# Patient Record
Sex: FEMALE | Race: WHITE | NOT HISPANIC OR LATINO | URBAN - METROPOLITAN AREA
[De-identification: names, ages, dates, MRNs, and addresses within clinical notes are randomized per-mention and may not be internally consistent; named-entity substitution may affect disease eponyms.]

---

## 2017-10-23 ENCOUNTER — OUTPATIENT (OUTPATIENT)
Dept: OUTPATIENT SERVICES | Facility: HOSPITAL | Age: 62
LOS: 1 days | Discharge: HOME | End: 2017-10-23

## 2017-10-23 DIAGNOSIS — Z12.31 ENCOUNTER FOR SCREENING MAMMOGRAM FOR MALIGNANT NEOPLASM OF BREAST: ICD-10-CM

## 2018-10-26 ENCOUNTER — OUTPATIENT (OUTPATIENT)
Dept: OUTPATIENT SERVICES | Facility: HOSPITAL | Age: 63
LOS: 1 days | Discharge: HOME | End: 2018-10-26

## 2018-10-26 DIAGNOSIS — Z12.31 ENCOUNTER FOR SCREENING MAMMOGRAM FOR MALIGNANT NEOPLASM OF BREAST: ICD-10-CM

## 2019-10-28 ENCOUNTER — OUTPATIENT (OUTPATIENT)
Dept: OUTPATIENT SERVICES | Facility: HOSPITAL | Age: 64
LOS: 1 days | Discharge: HOME | End: 2019-10-28
Payer: MEDICARE

## 2019-10-28 ENCOUNTER — OTHER (OUTPATIENT)
Age: 64
End: 2019-10-28

## 2019-10-28 DIAGNOSIS — Z12.31 ENCOUNTER FOR SCREENING MAMMOGRAM FOR MALIGNANT NEOPLASM OF BREAST: ICD-10-CM

## 2019-10-28 PROBLEM — Z00.00 ENCOUNTER FOR PREVENTIVE HEALTH EXAMINATION: Status: ACTIVE | Noted: 2019-10-28

## 2019-10-28 PROCEDURE — 77063 BREAST TOMOSYNTHESIS BI: CPT | Mod: 26

## 2019-10-28 PROCEDURE — 77067 SCR MAMMO BI INCL CAD: CPT | Mod: 26

## 2019-10-29 ENCOUNTER — OTHER (OUTPATIENT)
Age: 64
End: 2019-10-29

## 2019-10-29 ENCOUNTER — OUTPATIENT (OUTPATIENT)
Dept: OUTPATIENT SERVICES | Facility: HOSPITAL | Age: 64
LOS: 1 days | Discharge: HOME | End: 2019-10-29
Payer: MEDICARE

## 2019-10-29 DIAGNOSIS — R92.8 OTHER ABNORMAL AND INCONCLUSIVE FINDINGS ON DIAGNOSTIC IMAGING OF BREAST: ICD-10-CM

## 2019-10-29 PROCEDURE — G0279: CPT | Mod: 26,RT

## 2019-10-29 PROCEDURE — 76642 ULTRASOUND BREAST LIMITED: CPT | Mod: 26,RT

## 2019-10-29 PROCEDURE — 77065 DX MAMMO INCL CAD UNI: CPT | Mod: 26,RT

## 2019-10-30 ENCOUNTER — TRANSCRIPTION ENCOUNTER (OUTPATIENT)
Age: 64
End: 2019-10-30

## 2020-03-16 ENCOUNTER — APPOINTMENT (OUTPATIENT)
Dept: OBGYN | Facility: CLINIC | Age: 65
End: 2020-03-16
Payer: MEDICARE

## 2020-03-16 VITALS
SYSTOLIC BLOOD PRESSURE: 128 MMHG | HEIGHT: 62 IN | DIASTOLIC BLOOD PRESSURE: 80 MMHG | BODY MASS INDEX: 25.21 KG/M2 | WEIGHT: 137 LBS | HEART RATE: 79 BPM

## 2020-03-16 DIAGNOSIS — Z80.3 FAMILY HISTORY OF MALIGNANT NEOPLASM OF BREAST: ICD-10-CM

## 2020-03-16 DIAGNOSIS — Z82.49 FAMILY HISTORY OF ISCHEMIC HEART DISEASE AND OTHER DISEASES OF THE CIRCULATORY SYSTEM: ICD-10-CM

## 2020-03-16 PROCEDURE — G0101: CPT

## 2020-03-16 NOTE — PHYSICAL EXAM
[Awake] : awake [Alert] : alert [Acute Distress] : no acute distress [Mass] : no breast mass [Nipple Discharge] : no nipple discharge [Axillary LAD] : no axillary lymphadenopathy [Soft] : soft [Tender] : non tender [Oriented x3] : oriented to person, place, and time [Vulvar Atrophy] : vulvar atrophy [Normal] : uterus [Atrophy] : atrophy [No Bleeding] : there was no active vaginal bleeding [Uterine Adnexae] : were not tender and not enlarged [No Tenderness] : no rectal tenderness [External Hemorrhoid] : an external hemorrhoid

## 2020-03-16 NOTE — CHIEF COMPLAINT
[Annual Visit] : annual visit [FreeTextEntry1] : Patient is here for annual exam , up to date with PE , blood work ,  hx abnormal Pap smear in the past , patient denies pelvic pain , vaginal bleeding , mammography due  in October 2020

## 2020-03-16 NOTE — DISCUSSION/SUMMARY
[FreeTextEntry1] : Patient here for routine exam. Has history of cone biopsy and abnormal pap.\par Has no complaints\par High risk, needs to be seen yearly.\par \par Lidia Ji M.D.\par

## 2020-03-18 LAB — HPV HIGH+LOW RISK DNA PNL CVX: NOT DETECTED

## 2020-03-19 ENCOUNTER — APPOINTMENT (OUTPATIENT)
Dept: GASTROENTEROLOGY | Facility: CLINIC | Age: 65
End: 2020-03-19

## 2020-06-02 ENCOUNTER — TRANSCRIPTION ENCOUNTER (OUTPATIENT)
Age: 65
End: 2020-06-02

## 2020-09-01 ENCOUNTER — APPOINTMENT (OUTPATIENT)
Dept: GASTROENTEROLOGY | Facility: CLINIC | Age: 65
End: 2020-09-01
Payer: MEDICARE

## 2020-09-01 DIAGNOSIS — Z78.9 OTHER SPECIFIED HEALTH STATUS: ICD-10-CM

## 2020-09-01 PROCEDURE — 99204 OFFICE O/P NEW MOD 45 MIN: CPT | Mod: 95

## 2020-09-01 NOTE — ASSESSMENT
[FreeTextEntry1] : 65 year old female patient average risk for CRC, presents for screening colonoscopy.\par She reports postprandial epigastric pain, new onset and concerning to her. \par No nausea, vomiting, weight loss, dysphagia, blood in stools or abdominal pain, however she reports new changes in bowel habits towards more constipation. \par \par Rec: EGD, screening colonoscopy\par Risks and benefits discussed with patient.\par

## 2020-09-01 NOTE — HISTORY OF PRESENT ILLNESS
[Home] : at home, [unfilled] , at the time of the visit. [Medical Office: ([facility])___] : at the medical office located in  [Verbal consent obtained from patient] : the patient, [unfilled] [FreeTextEntry4] : Loreto Jaime [de-identified] : 65 year old female patient average risk for CRC, presents for screening colonoscopy.\par She reports postprandial epigastric pain, new onset and concerning to her. \par No nausea, vomiting, weight loss, dysphagia, blood in stools or abdominal pain, however she reports new changes in bowel habits towards more constipation.

## 2020-10-27 ENCOUNTER — LABORATORY RESULT (OUTPATIENT)
Age: 65
End: 2020-10-27

## 2020-10-27 ENCOUNTER — OUTPATIENT (OUTPATIENT)
Dept: OUTPATIENT SERVICES | Facility: HOSPITAL | Age: 65
LOS: 1 days | Discharge: HOME | End: 2020-10-27

## 2020-10-27 DIAGNOSIS — Z11.59 ENCOUNTER FOR SCREENING FOR OTHER VIRAL DISEASES: ICD-10-CM

## 2020-10-29 ENCOUNTER — OUTPATIENT (OUTPATIENT)
Dept: OUTPATIENT SERVICES | Facility: HOSPITAL | Age: 65
LOS: 1 days | Discharge: HOME | End: 2020-10-29
Payer: MEDICARE

## 2020-10-29 DIAGNOSIS — Z12.31 ENCOUNTER FOR SCREENING MAMMOGRAM FOR MALIGNANT NEOPLASM OF BREAST: ICD-10-CM

## 2020-10-29 PROCEDURE — 77063 BREAST TOMOSYNTHESIS BI: CPT | Mod: 26

## 2020-10-29 PROCEDURE — 77067 SCR MAMMO BI INCL CAD: CPT | Mod: 26

## 2020-10-29 NOTE — ASU PATIENT PROFILE, ADULT - PSH
History of surgery  LAST COLONOSCOPY EIGHT YEARS AGO  History of surgery  CHOLECYSTECTOMY  History of surgery  C- SECTION X1  History of surgery  RIGHT ANKLE SURGERYWITH PLATES AND SCREWS IMPLANTED

## 2020-10-30 ENCOUNTER — TRANSCRIPTION ENCOUNTER (OUTPATIENT)
Age: 65
End: 2020-10-30

## 2020-10-30 ENCOUNTER — OUTPATIENT (OUTPATIENT)
Dept: OUTPATIENT SERVICES | Facility: HOSPITAL | Age: 65
LOS: 1 days | Discharge: HOME | End: 2020-10-30
Payer: MEDICARE

## 2020-10-30 ENCOUNTER — RESULT REVIEW (OUTPATIENT)
Age: 65
End: 2020-10-30

## 2020-10-30 VITALS
RESPIRATION RATE: 17 BRPM | TEMPERATURE: 98 F | SYSTOLIC BLOOD PRESSURE: 129 MMHG | DIASTOLIC BLOOD PRESSURE: 75 MMHG | HEIGHT: 62 IN | OXYGEN SATURATION: 98 % | WEIGHT: 134.04 LBS | HEART RATE: 75 BPM

## 2020-10-30 VITALS — DIASTOLIC BLOOD PRESSURE: 71 MMHG | SYSTOLIC BLOOD PRESSURE: 138 MMHG | HEART RATE: 77 BPM | RESPIRATION RATE: 15 BRPM

## 2020-10-30 DIAGNOSIS — Z98.890 OTHER SPECIFIED POSTPROCEDURAL STATES: Chronic | ICD-10-CM

## 2020-10-30 PROCEDURE — 88305 TISSUE EXAM BY PATHOLOGIST: CPT | Mod: 26

## 2020-10-30 PROCEDURE — 43239 EGD BIOPSY SINGLE/MULTIPLE: CPT | Mod: XS

## 2020-10-30 PROCEDURE — 88312 SPECIAL STAINS GROUP 1: CPT | Mod: 26

## 2020-10-30 PROCEDURE — 45380 COLONOSCOPY AND BIOPSY: CPT

## 2020-10-30 RX ORDER — AMLODIPINE BESYLATE 2.5 MG/1
1 TABLET ORAL
Qty: 0 | Refills: 0 | DISCHARGE

## 2020-10-30 NOTE — ASU DISCHARGE PLAN (ADULT/PEDIATRIC) - CARE PROVIDER_API CALL
Yoko Garcia (MD)  Gastroenterology  [address] [place] [address]  Phone: [phone]  Fax: [phone]  Follow Up Time:

## 2020-10-30 NOTE — PRE-ANESTHESIA EVALUATION ADULT - NSANTHOSAYNRD_GEN_A_CORE
denies/No. LAUREN screening performed.  STOP BANG Legend: 0-2 = LOW Risk; 3-4 = INTERMEDIATE Risk; 5-8 = HIGH Risk

## 2020-11-02 LAB
SURGICAL PATHOLOGY STUDY: SIGNIFICANT CHANGE UP
SURGICAL PATHOLOGY STUDY: SIGNIFICANT CHANGE UP

## 2020-11-03 DIAGNOSIS — K44.9 DIAPHRAGMATIC HERNIA WITHOUT OBSTRUCTION OR GANGRENE: ICD-10-CM

## 2020-11-03 DIAGNOSIS — Z12.11 ENCOUNTER FOR SCREENING FOR MALIGNANT NEOPLASM OF COLON: ICD-10-CM

## 2020-11-03 DIAGNOSIS — K64.4 RESIDUAL HEMORRHOIDAL SKIN TAGS: ICD-10-CM

## 2020-11-03 DIAGNOSIS — K63.5 POLYP OF COLON: ICD-10-CM

## 2020-11-03 DIAGNOSIS — Z88.2 ALLERGY STATUS TO SULFONAMIDES: ICD-10-CM

## 2020-11-03 DIAGNOSIS — K29.50 UNSPECIFIED CHRONIC GASTRITIS WITHOUT BLEEDING: ICD-10-CM

## 2020-11-03 DIAGNOSIS — K20.90 ESOPHAGITIS, UNSPECIFIED WITHOUT BLEEDING: ICD-10-CM

## 2020-11-10 PROBLEM — I10 ESSENTIAL (PRIMARY) HYPERTENSION: Chronic | Status: ACTIVE | Noted: 2020-10-30

## 2020-11-17 ENCOUNTER — APPOINTMENT (OUTPATIENT)
Dept: GASTROENTEROLOGY | Facility: CLINIC | Age: 65
End: 2020-11-17
Payer: MEDICARE

## 2020-11-17 DIAGNOSIS — K44.9 DIAPHRAGMATIC HERNIA W/OUT OBSTRUCTION OR GANGRENE: ICD-10-CM

## 2020-11-17 DIAGNOSIS — K59.09 OTHER CONSTIPATION: ICD-10-CM

## 2020-11-17 DIAGNOSIS — K21.00 GASTRO-ESOPHAGEAL REFLUX DISEASE WITH ESOPHAGITIS, WITHOUT BLEEDING: ICD-10-CM

## 2020-11-17 PROCEDURE — 99214 OFFICE O/P EST MOD 30 MIN: CPT | Mod: 95

## 2020-11-17 RX ORDER — POLYETHYLENE GLYCOL 3350, SODIUM SULFATE, SODIUM CHLORIDE, POTASSIUM CHLORIDE, ASCORBIC ACID, SODIUM ASCORBATE 7.5-2.691G
100 KIT ORAL
Qty: 1 | Refills: 0 | Status: DISCONTINUED | COMMUNITY
Start: 2020-09-01 | End: 2020-11-17

## 2020-11-17 NOTE — HISTORY OF PRESENT ILLNESS
[Home] : at home, [unfilled] , at the time of the visit. [Medical Office: ([facility])___] : at the medical office located in  [Verbal consent obtained from patient] : the patient, [unfilled] [FreeTextEntry4] : Loreto Jaime [de-identified] : 65 year old female patient average risk for CRC, presents for screening colonoscopy.\par She reports postprandial epigastric pain, new onset and concerning to her. \par No nausea, vomiting, weight loss, dysphagia, blood in stools or abdominal pain, however she reports new changes in bowel habits towards more constipation. \par s/p EGD with grade 1 esophagitis, and colonoscopy with fair prep and HP, recommended repeat in 3 years w better prep.

## 2020-11-17 NOTE — ASSESSMENT
[FreeTextEntry1] : 65 year old female patient average risk for CRC, presents for screening colonoscopy.\par She reports postprandial epigastric pain, new onset and concerning to her. \par No nausea, vomiting, weight loss, dysphagia, blood in stools or abdominal pain, however she reports new changes in bowel habits towards more constipation. \par s/p EGD with grade 1 esophagitis, and colonoscopy with fair prep and HP, recommended repeat in 3 years w better prep. \par \par \par GERD/esophagitis\par pantoprazole 40 daily, to be tapered in 8 weeks\par \par CRC screening, fair prep, repeat in 3 years

## 2021-03-22 ENCOUNTER — APPOINTMENT (OUTPATIENT)
Dept: OBGYN | Facility: CLINIC | Age: 66
End: 2021-03-22

## 2021-04-16 ENCOUNTER — ASOB RESULT (OUTPATIENT)
Age: 66
End: 2021-04-16

## 2021-04-16 ENCOUNTER — APPOINTMENT (OUTPATIENT)
Dept: OBGYN | Facility: CLINIC | Age: 66
End: 2021-04-16
Payer: MEDICARE

## 2021-04-16 VITALS
HEART RATE: 75 BPM | HEIGHT: 62 IN | SYSTOLIC BLOOD PRESSURE: 123 MMHG | TEMPERATURE: 96.7 F | WEIGHT: 135 LBS | BODY MASS INDEX: 24.84 KG/M2 | DIASTOLIC BLOOD PRESSURE: 81 MMHG

## 2021-04-16 DIAGNOSIS — Z86.19 PERSONAL HISTORY OF OTHER INFECTIOUS AND PARASITIC DISEASES: ICD-10-CM

## 2021-04-16 DIAGNOSIS — Z80.7 FAMILY HISTORY OF OTHER MALIGNANT NEOPLASMS OF LYMPHOID, HEMATOPOIETIC AND RELATED TISSUES: ICD-10-CM

## 2021-04-16 DIAGNOSIS — Z80.0 FAMILY HISTORY OF MALIGNANT NEOPLASM OF DIGESTIVE ORGANS: ICD-10-CM

## 2021-04-16 LAB
BILIRUB UR QL STRIP: NEGATIVE
CLARITY UR: CLEAR
COLLECTION METHOD: NORMAL
GLUCOSE UR-MCNC: NEGATIVE
HCG UR QL: 0.2 EU/DL
HGB UR QL STRIP.AUTO: NEGATIVE
KETONES UR-MCNC: NEGATIVE
LEUKOCYTE ESTERASE UR QL STRIP: ABNORMAL
NITRITE UR QL STRIP: NEGATIVE
PH UR STRIP: 7
PROT UR STRIP-MCNC: NEGATIVE
SP GR UR STRIP: 1.02

## 2021-04-16 PROCEDURE — 81003 URINALYSIS AUTO W/O SCOPE: CPT | Mod: QW

## 2021-04-16 PROCEDURE — G0101: CPT

## 2021-04-16 PROCEDURE — 76830 TRANSVAGINAL US NON-OB: CPT

## 2021-04-16 PROCEDURE — ZZZZZ: CPT

## 2021-04-16 NOTE — PHYSICAL EXAM
[Appropriately responsive] : appropriately responsive [Alert] : alert [No Acute Distress] : no acute distress [No Lymphadenopathy] : no lymphadenopathy [Regular Rate Rhythm] : regular rate rhythm [Soft] : soft [Non-tender] : non-tender [Non-distended] : non-distended [No Mass] : no mass [Oriented x3] : oriented x3 [Examination Of The Breasts] : a normal appearance [Normal] : normal [No Discharge] : no discharge [No Masses] : no breast masses were palpable [Vulvar Atrophy] : vulvar atrophy [No Lesions] : no lesions  [Labia Majora] : normal [Labia Minora] : normal [Atrophy] : atrophy [No Bleeding] : There was no active vaginal bleeding [Tenderness] : nontender [Enlarged ___ wks] : not enlarged [Mass ___ cm] : no uterine mass was palpated [Uterine Adnexae] : normal [FreeTextEntry4] : yellow discharge with mild odor- indicative of BV [FreeTextEntry5] : pap smear done

## 2021-04-16 NOTE — COUNSELING
[Nutrition/ Exercise/ Weight Management] : nutrition, exercise, weight management [Vitamins/Supplements] : vitamins/supplements [Breast Self Exam] : breast self exam [Bladder Hygiene] : bladder hygiene [Contraception/ Emergency Contraception/ Safe Sexual Practices] : contraception, emergency contraception, safe sexual practices [STD (testing, results, tx)] : STD (testing, results, tx) [Lab Results] : lab results [Medication Management] : medication management

## 2021-04-16 NOTE — DISCUSSION/SUMMARY
[FreeTextEntry1] : TVS done showing uterus 4x3cm, no discreet fibroids noted on today's exam, the EMS was 2.2mm appropriate for menopause, both ovaries appear WNL, limited exam due to bowel, no significant masses or free fluid noted.  Patient counseled accordingly and given strict pain and bleeding precautions.  She understood all counseling and all questions answered satisfactorily\par \par A: 64yo for annual GYN exam, menopause, BV, vulvovaginal atrophy\par \par P: GYN Exam done\par     pap smear done due to h/o of abnl pap/HPV/cone bx\par     TVS done\par     safe sex practices if active\par     pain and bleeding precautions\par     metrogel rx\par     encouraged healthy diet and exercise\par     mammogram referral given\par     f/u 2 years or PRN

## 2021-04-16 NOTE — HISTORY OF PRESENT ILLNESS
[Patient reported mammogram was normal] : Patient reported mammogram was normal [Patient reported PAP Smear was normal] : Patient reported PAP Smear was normal [Patient reported colonoscopy was normal] : Patient reported colonoscopy was normal [LMP unknown] : LMP unknown [postmenopausal] : postmenopausal [N] : Patient is not sexually active [Y] : Positive pregnancy history [Menarche Age: ____] : age at menarche was [unfilled] [Currently In Menopause] : currently in menopause [Menopause Age: ____] : age at menopause was [unfilled] [Previously active] : previously active [Patient reported bone density results were abnormal] : Patient reported bone density results were abnormal [Gonorrhea test offered] : Gonorrhea test offered [Chlamydia test offered] : Chlamydia test offered [HPV test offered] : HPV test offered [Trichomonas test offered] : Trichomonas test offered [Men] : men [No] : No [FreeTextEntry1] : Pt is here for her annual exam. Overall pt feels good, denies any pelvic pain or abnormal bleeding. Pt does state she has Hx of abnormal pap smears and fibroids.  [TextBox_4] : 64yo  in menopause came for annual GYN exam and pap smear.  She denies PMB, pelvic pain, or dysuria.  She states occasional discharge and having some itching of vulva.  She does have a h/o abnl pap with HPV and cone bx, but states has been normal for several years. She denies other STDs or cysts but states has a remote h/o fibroids and would like pelvic sonogram to evaluate.  She is not currently sexually active. [Mammogramdate] : 10/29/20 [PapSmeardate] : 3/2020 [TextBox_31] : atrophy [BoneDensityDate] : 7/22/2020 [TextBox_37] : osteopenia [ColonoscopyDate] : 10/30/20 [TextBox_43] : benign polyps, told 3 yr follow up [PGxTotal] : 1 [[facility]] : 1

## 2021-04-18 LAB — BACTERIA UR CULT: NORMAL

## 2021-04-20 LAB
C TRACH RRNA SPEC QL NAA+PROBE: NOT DETECTED
HPV HIGH+LOW RISK DNA PNL CVX: NOT DETECTED
N GONORRHOEA RRNA SPEC QL NAA+PROBE: NOT DETECTED
SOURCE AMPLIFICATION: NORMAL

## 2021-04-21 LAB
SOURCE AMPLIFICATION: NORMAL
T VAGINALIS RRNA SPEC QL NAA+PROBE: NOT DETECTED

## 2021-04-27 LAB — CYTOLOGY CVX/VAG DOC THIN PREP: ABNORMAL

## 2021-08-17 ENCOUNTER — TRANSCRIPTION ENCOUNTER (OUTPATIENT)
Age: 66
End: 2021-08-17

## 2021-09-26 LAB — BACTERIA UR CULT: NORMAL

## 2021-09-27 ENCOUNTER — NON-APPOINTMENT (OUTPATIENT)
Age: 66
End: 2021-09-27

## 2021-10-30 ENCOUNTER — RESULT REVIEW (OUTPATIENT)
Age: 66
End: 2021-10-30

## 2021-10-30 ENCOUNTER — OUTPATIENT (OUTPATIENT)
Dept: OUTPATIENT SERVICES | Facility: HOSPITAL | Age: 66
LOS: 1 days | Discharge: HOME | End: 2021-10-30
Payer: MEDICARE

## 2021-10-30 DIAGNOSIS — Z98.890 OTHER SPECIFIED POSTPROCEDURAL STATES: Chronic | ICD-10-CM

## 2021-10-30 DIAGNOSIS — Z12.31 ENCOUNTER FOR SCREENING MAMMOGRAM FOR MALIGNANT NEOPLASM OF BREAST: ICD-10-CM

## 2021-10-30 PROCEDURE — 77063 BREAST TOMOSYNTHESIS BI: CPT | Mod: 26

## 2021-10-30 PROCEDURE — 77067 SCR MAMMO BI INCL CAD: CPT | Mod: 26

## 2021-12-01 ENCOUNTER — OUTPATIENT (OUTPATIENT)
Dept: OUTPATIENT SERVICES | Facility: HOSPITAL | Age: 66
LOS: 1 days | Discharge: HOME | End: 2021-12-01

## 2021-12-01 ENCOUNTER — APPOINTMENT (OUTPATIENT)
Dept: UROGYNECOLOGY | Facility: CLINIC | Age: 66
End: 2021-12-01
Payer: MEDICARE

## 2021-12-01 VITALS
SYSTOLIC BLOOD PRESSURE: 120 MMHG | DIASTOLIC BLOOD PRESSURE: 78 MMHG | WEIGHT: 134 LBS | HEIGHT: 62 IN | BODY MASS INDEX: 24.66 KG/M2

## 2021-12-01 DIAGNOSIS — Z78.0 ASYMPTOMATIC MENOPAUSAL STATE: ICD-10-CM

## 2021-12-01 DIAGNOSIS — Z98.890 OTHER SPECIFIED POSTPROCEDURAL STATES: Chronic | ICD-10-CM

## 2021-12-01 DIAGNOSIS — R39.9 UNSPECIFIED SYMPTOMS AND SIGNS INVOLVING THE GENITOURINARY SYSTEM: ICD-10-CM

## 2021-12-01 DIAGNOSIS — Z87.42 PERSONAL HISTORY OF OTHER DISEASES OF THE FEMALE GENITAL TRACT: ICD-10-CM

## 2021-12-01 DIAGNOSIS — N76.0 ACUTE VAGINITIS: ICD-10-CM

## 2021-12-01 DIAGNOSIS — Z78.9 OTHER SPECIFIED HEALTH STATUS: ICD-10-CM

## 2021-12-01 DIAGNOSIS — B96.89 ACUTE VAGINITIS: ICD-10-CM

## 2021-12-01 DIAGNOSIS — Z12.11 ENCOUNTER FOR SCREENING FOR MALIGNANT NEOPLASM OF COLON: ICD-10-CM

## 2021-12-01 DIAGNOSIS — R19.4 CHANGE IN BOWEL HABIT: ICD-10-CM

## 2021-12-01 DIAGNOSIS — I10 ESSENTIAL (PRIMARY) HYPERTENSION: ICD-10-CM

## 2021-12-01 DIAGNOSIS — Z87.19 PERSONAL HISTORY OF OTHER DISEASES OF THE DIGESTIVE SYSTEM: ICD-10-CM

## 2021-12-01 DIAGNOSIS — Z86.018 PERSONAL HISTORY OF OTHER BENIGN NEOPLASM: ICD-10-CM

## 2021-12-01 PROCEDURE — 99205 OFFICE O/P NEW HI 60 MIN: CPT

## 2021-12-01 PROCEDURE — 51701 INSERT BLADDER CATHETER: CPT

## 2021-12-01 RX ORDER — AMLODIPINE BESYLATE 5 MG/1
5 TABLET ORAL
Refills: 0 | Status: ACTIVE | COMMUNITY

## 2021-12-01 RX ORDER — METRONIDAZOLE 7.5 MG/G
0.75 GEL VAGINAL
Qty: 1 | Refills: 1 | Status: DISCONTINUED | COMMUNITY
Start: 2021-04-16 | End: 2021-12-01

## 2021-12-01 RX ORDER — CHROMIUM 200 MCG
TABLET ORAL
Refills: 0 | Status: DISCONTINUED | COMMUNITY
End: 2021-12-01

## 2021-12-01 NOTE — HISTORY OF PRESENT ILLNESS
[FreeTextEntry1] : \par Pt with pelvic floor dysfunction here for urogynecologic evaluation. She describes: \par \par Chief PFD: urinary frequency\par \par Records reviewed:\par 9/23/21: <10K urogenital ruth\par 4/16/1 <10K urogenital ruth\par 8/17/21 e coli 50-99k, pansensitive. Recently finished a course of cefepodixime and empiric treatment of fluconazole. \par \par Pelvic organ prolapse: s/p cone biopsy, s/p c/s, s/p phil, no bulge, denies splinting\par Stress urinary incontinence: denies\par Overactive bladder syndrome: Reports some intermittent leakage and urgency. Reports if she does not urinate she experiences lower abdominal cramping. Reports leakage without warning several times a week for the last several months with intermittent worsening. no prior treatment, no glaucoma\par Voiding dysfunction: Reports intermittent incomplete bladder emptying. Denies hesitancy \par Lower urinary tract/vaginal symptoms: no recurrent UTIs per year. No hematuria, dysuria, bladder pain currently. \par Fecal incontinence: Denies \par Defecatory dysfunction: recent type I and it's controlled with miralax\par Sexual dysfunction: Not sexually active\par Pelvic pain: Denies \par Vaginal dryness: denies bothersome\par \par Her pelvic floor symptoms are significantly bothersome and negatively impacting her quality of life. \par \par

## 2021-12-01 NOTE — COUNSELING
[FreeTextEntry1] : \par We will notify you of the urine results if they are abnormal\par \par Please take the diflucan x2 for the yeast.\par \par Please call my office if you have any issues with the cost or side effects of the medication.\par \par Please increase your water intake to at least 3-4 bottles of water per day\par \par For 4-5 days, cut one item from the list out of your diet.\par \par After 4-5 days, it it makes a difference, you have to decide if it is worth keeping it out of your diet to help with the urinary issues.\par \par If it does not make a difference, you should add it back into your diet and remove another item for another 4-5 days.\par \par Referral to pelvic floor physical therapy\par \par [facility]\par [address], [address]\[place] [address]\par Phone: [phone]\par Fax: [phone]\par \par Please call the office if you feel like you do not have enough improvement of your symptoms towards the end of your physical therapy treatment so that we can arrange the next step of management.\par \par Follow up as needed

## 2021-12-01 NOTE — DISCUSSION/SUMMARY
[FreeTextEntry1] : \par Urge incontinence-\par The pathophysiology of the above condition was discussed with the patient. The patient was given information and education on pelvic floor muscle exercises/rehabilitation, avoidance of dietary bladder irritants, and other strategies to improve bladder control, such as scheduled voiding. She was counseled regarding further management strategies for overactive bladder and urge incontinence including pelvic floor physical therapy, medications or surgical management. The patient voiced understanding and agrees with diet changes and a referral to PT. We will follow up on her urine tests.\par \par Vaginal itching-\par Will empirically treat for yeast with Diflucan x 2.\par

## 2021-12-01 NOTE — PHYSICAL EXAM
[Chaperone Present] : A chaperone was present in the examining room during all aspects of the physical examination [FreeTextEntry1] : Void:  300cc\par PVR:  100cc (normal PVR for this volume voided is 133cc or less)\par Urethra was prepped in sterile fashion and then a sterile catheter was used by me to drain the bladder.\par  \par Well healed incision: laparoscopic, Pfannenstiel\par Normal perineal sensation\par Normal perineal reflexes\par Negative cough stress test\par Positive atrophy\par +yeast on labia\par No prolapse\par Fixed urethra\par Bilateral levator ani spasm, positive tenderness\par No urethral tenderness\par No bladder tenderness\par No cervical tenderness\par No uterine tenderness\par 2/5 Kegel\par

## 2021-12-02 LAB
APPEARANCE: CLEAR
BILIRUBIN URINE: NEGATIVE
BLOOD URINE: NEGATIVE
COLOR: COLORLESS
GLUCOSE QUALITATIVE U: NEGATIVE
KETONES URINE: NEGATIVE
LEUKOCYTE ESTERASE URINE: NEGATIVE
NITRITE URINE: NEGATIVE
PH URINE: 6.5
PROTEIN URINE: NEGATIVE
SPECIFIC GRAVITY URINE: 1
UROBILINOGEN URINE: NORMAL

## 2021-12-03 ENCOUNTER — APPOINTMENT (OUTPATIENT)
Dept: UROGYNECOLOGY | Facility: CLINIC | Age: 66
End: 2021-12-03

## 2021-12-03 LAB — BACTERIA UR CULT: NORMAL

## 2022-07-13 ENCOUNTER — NON-APPOINTMENT (OUTPATIENT)
Age: 67
End: 2022-07-13

## 2022-07-30 NOTE — ASU DISCHARGE PLAN (ADULT/PEDIATRIC) - SIGNS AND SYMPTOMS OF INFECTION: FEVER, REDNESS, SWELLING, FOUL SMELLING DISCHARGE
Goal Outcome Evaluation:      Pt. Pleasant and cooperative this shift, denies pain, k+ protocol ran and replaced and will recheck this afternoon, purewick in place, started regular diet and tolerated well, alert and oriented, will cont to monitor.                  Statement Selected

## 2022-09-29 ENCOUNTER — NON-APPOINTMENT (OUTPATIENT)
Age: 67
End: 2022-09-29

## 2022-10-11 ENCOUNTER — NON-APPOINTMENT (OUTPATIENT)
Age: 67
End: 2022-10-11

## 2022-10-25 ENCOUNTER — NON-APPOINTMENT (OUTPATIENT)
Age: 67
End: 2022-10-25

## 2022-10-31 ENCOUNTER — NON-APPOINTMENT (OUTPATIENT)
Age: 67
End: 2022-10-31

## 2022-10-31 ENCOUNTER — OUTPATIENT (OUTPATIENT)
Dept: OUTPATIENT SERVICES | Facility: HOSPITAL | Age: 67
LOS: 1 days | Discharge: HOME | End: 2022-10-31

## 2022-10-31 DIAGNOSIS — Z98.890 OTHER SPECIFIED POSTPROCEDURAL STATES: Chronic | ICD-10-CM

## 2022-10-31 DIAGNOSIS — Z12.31 ENCOUNTER FOR SCREENING MAMMOGRAM FOR MALIGNANT NEOPLASM OF BREAST: ICD-10-CM

## 2022-10-31 PROCEDURE — 77067 SCR MAMMO BI INCL CAD: CPT | Mod: 26

## 2022-10-31 PROCEDURE — 77063 BREAST TOMOSYNTHESIS BI: CPT | Mod: 26

## 2022-11-01 ENCOUNTER — NON-APPOINTMENT (OUTPATIENT)
Age: 67
End: 2022-11-01

## 2022-11-15 ENCOUNTER — OUTPATIENT (OUTPATIENT)
Dept: OUTPATIENT SERVICES | Facility: HOSPITAL | Age: 67
LOS: 1 days | Discharge: HOME | End: 2022-11-15

## 2022-11-15 ENCOUNTER — RESULT REVIEW (OUTPATIENT)
Age: 67
End: 2022-11-15

## 2022-11-15 DIAGNOSIS — Z98.890 OTHER SPECIFIED POSTPROCEDURAL STATES: Chronic | ICD-10-CM

## 2022-11-15 DIAGNOSIS — R10.2 PELVIC AND PERINEAL PAIN: ICD-10-CM

## 2022-11-15 PROCEDURE — 76830 TRANSVAGINAL US NON-OB: CPT | Mod: 26

## 2022-11-15 PROCEDURE — 76856 US EXAM PELVIC COMPLETE: CPT | Mod: 26

## 2022-11-30 ENCOUNTER — APPOINTMENT (OUTPATIENT)
Dept: OBGYN | Facility: CLINIC | Age: 67
End: 2022-11-30

## 2022-12-07 ENCOUNTER — APPOINTMENT (OUTPATIENT)
Dept: UROGYNECOLOGY | Facility: CLINIC | Age: 67
End: 2022-12-07

## 2022-12-07 VITALS
BODY MASS INDEX: 24.84 KG/M2 | DIASTOLIC BLOOD PRESSURE: 81 MMHG | WEIGHT: 135 LBS | HEART RATE: 68 BPM | HEIGHT: 62 IN | SYSTOLIC BLOOD PRESSURE: 123 MMHG

## 2022-12-07 DIAGNOSIS — Z87.42 PERSONAL HISTORY OF OTHER DISEASES OF THE FEMALE GENITAL TRACT: ICD-10-CM

## 2022-12-07 PROCEDURE — 99213 OFFICE O/P EST LOW 20 MIN: CPT

## 2022-12-07 RX ORDER — PANTOPRAZOLE 40 MG/1
40 TABLET, DELAYED RELEASE ORAL DAILY
Qty: 30 | Refills: 6 | Status: COMPLETED | COMMUNITY
Start: 2020-11-17 | End: 2022-12-07

## 2022-12-07 RX ORDER — FLUCONAZOLE 150 MG/1
150 TABLET ORAL
Qty: 2 | Refills: 0 | Status: COMPLETED | COMMUNITY
Start: 2021-12-01 | End: 2021-12-01

## 2022-12-07 NOTE — HISTORY OF PRESENT ILLNESS
[FreeTextEntry1] : Patient is here for 12 months med check for urge incontinence.\par Last seen on 12/1/21 as a new pt with frequency. \par \par Records reviewed:\par 9/23/21: <10K urogenital ruth\par 4/16/1 <10K urogenital ruth\par 8/17/21 e coli 50-99k, pansensitive. Recently finished a course of cefepodixime and empiric treatment of fluconazole. \par \par s/p cone biopsy, s/p c/s, s/p phil\par no glaucoma\par No prolapse\par \par Pelvic floor PT\par Diet changes\par \par 10/26/22, UTI: >100K E Coli, pansensitive Treated with Keflex 500 mg BID\par \par Today, patient states she is doing better overall. Her frequency is better. She decreased coffee, chocolate and alcohol. C/o urinating 2x a night. Reports that she never did PT but would like to try pelvic floor PT at this time. Pt is not ready to take bladder meds. Patient does not feel she has an infection.\par \par

## 2022-12-07 NOTE — DISCUSSION/SUMMARY
[FreeTextEntry1] : Urge Incontinence-\par Referral to PT given\par Noctuira modifications d/w pt\par Precautions reviewed.\par Will return in 6 months for follow up or earlier if she has any issues.\par \par \par

## 2022-12-07 NOTE — COUNSELING
[FreeTextEntry1] : If you feel like you have an infection it is important for you to call our office and we will arrange testing of your urine.\par \par Please arrange pelvic floor PT:\par \par [facility]\par [address] [place] [address]\par Phone: [phone]\par \par Please call my office if you have any issues with the cost or side effects of the medication. \par \par Schedule a 6 months follow up med check appointment with CHANTEL Miles.\par

## 2023-01-04 DIAGNOSIS — Z12.31 ENCOUNTER FOR SCREENING MAMMOGRAM FOR MALIGNANT NEOPLASM OF BREAST: ICD-10-CM

## 2023-01-04 DIAGNOSIS — R10.2 PELVIC AND PERINEAL PAIN: ICD-10-CM

## 2023-01-13 ENCOUNTER — NON-APPOINTMENT (OUTPATIENT)
Age: 68
End: 2023-01-13

## 2023-01-18 DIAGNOSIS — R92.2 INCONCLUSIVE MAMMOGRAM: ICD-10-CM

## 2023-01-23 ENCOUNTER — OUTPATIENT (OUTPATIENT)
Dept: OUTPATIENT SERVICES | Facility: HOSPITAL | Age: 68
LOS: 1 days | Discharge: HOME | End: 2023-01-23

## 2023-01-23 DIAGNOSIS — Z98.890 OTHER SPECIFIED POSTPROCEDURAL STATES: Chronic | ICD-10-CM

## 2023-01-24 ENCOUNTER — NON-APPOINTMENT (OUTPATIENT)
Age: 68
End: 2023-01-24

## 2023-01-24 DIAGNOSIS — Z78.0 ASYMPTOMATIC MENOPAUSAL STATE: ICD-10-CM

## 2023-01-24 DIAGNOSIS — M81.0 AGE-RELATED OSTEOPOROSIS WITHOUT CURRENT PATHOLOGICAL FRACTURE: ICD-10-CM

## 2023-01-24 DIAGNOSIS — Z13.820 ENCOUNTER FOR SCREENING FOR OSTEOPOROSIS: ICD-10-CM

## 2023-01-27 ENCOUNTER — RESULT REVIEW (OUTPATIENT)
Age: 68
End: 2023-01-27

## 2023-01-27 ENCOUNTER — OUTPATIENT (OUTPATIENT)
Dept: OUTPATIENT SERVICES | Facility: HOSPITAL | Age: 68
LOS: 1 days | Discharge: HOME | End: 2023-01-27
Payer: MEDICARE

## 2023-01-27 DIAGNOSIS — Z98.890 OTHER SPECIFIED POSTPROCEDURAL STATES: Chronic | ICD-10-CM

## 2023-01-27 DIAGNOSIS — R92.2 INCONCLUSIVE MAMMOGRAM: ICD-10-CM

## 2023-01-27 DIAGNOSIS — N64.4 MASTODYNIA: ICD-10-CM

## 2023-01-27 PROCEDURE — G0279: CPT | Mod: 26

## 2023-01-27 PROCEDURE — 76641 ULTRASOUND BREAST COMPLETE: CPT | Mod: 26,50,GZ

## 2023-01-27 PROCEDURE — 77066 DX MAMMO INCL CAD BI: CPT | Mod: 26

## 2023-04-17 ENCOUNTER — APPOINTMENT (OUTPATIENT)
Dept: OBGYN | Facility: CLINIC | Age: 68
End: 2023-04-17
Payer: MEDICARE

## 2023-04-17 VITALS
WEIGHT: 135 LBS | TEMPERATURE: 98.6 F | HEART RATE: 71 BPM | BODY MASS INDEX: 24.84 KG/M2 | SYSTOLIC BLOOD PRESSURE: 132 MMHG | HEIGHT: 62 IN | DIASTOLIC BLOOD PRESSURE: 94 MMHG

## 2023-04-17 DIAGNOSIS — L29.2 PRURITUS VULVAE: ICD-10-CM

## 2023-04-17 DIAGNOSIS — Z01.419 ENCOUNTER FOR GYNECOLOGICAL EXAMINATION (GENERAL) (ROUTINE) W/OUT ABNORMAL FINDINGS: ICD-10-CM

## 2023-04-17 DIAGNOSIS — N39.41 URGE INCONTINENCE: ICD-10-CM

## 2023-04-17 DIAGNOSIS — N89.8 OTHER SPECIFIED NONINFLAMMATORY DISORDERS OF VAGINA: ICD-10-CM

## 2023-04-17 DIAGNOSIS — Z71.89 OTHER SPECIFIED COUNSELING: ICD-10-CM

## 2023-04-17 DIAGNOSIS — N64.4 MASTODYNIA: ICD-10-CM

## 2023-04-17 DIAGNOSIS — R31.29 OTHER MICROSCOPIC HEMATURIA: ICD-10-CM

## 2023-04-17 DIAGNOSIS — Z12.4 ENCOUNTER FOR SCREENING FOR MALIGNANT NEOPLASM OF CERVIX: ICD-10-CM

## 2023-04-17 LAB
BILIRUB UR QL STRIP: NORMAL
CLARITY UR: CLEAR
COLLECTION METHOD: NORMAL
GLUCOSE UR-MCNC: NORMAL
HCG UR QL: 0.2 EU/DL
HGB UR QL STRIP.AUTO: ABNORMAL
KETONES UR-MCNC: NORMAL
LEUKOCYTE ESTERASE UR QL STRIP: ABNORMAL
NITRITE UR QL STRIP: NORMAL
PH UR STRIP: 7
PROT UR STRIP-MCNC: NORMAL
SP GR UR STRIP: 1.01

## 2023-04-17 PROCEDURE — 99214 OFFICE O/P EST MOD 30 MIN: CPT | Mod: 25

## 2023-04-17 PROCEDURE — G0101: CPT

## 2023-04-17 PROCEDURE — 81003 URINALYSIS AUTO W/O SCOPE: CPT | Mod: QW

## 2023-04-18 LAB
ESTRADIOL SERPL-MCNC: 17 PG/ML
PROGEST SERPL-MCNC: 0.2 NG/ML

## 2023-04-19 LAB
CYTOLOGY CVX/VAG DOC THIN PREP: ABNORMAL
HPV HIGH+LOW RISK DNA PNL CVX: NOT DETECTED

## 2023-04-21 DIAGNOSIS — N39.0 URINARY TRACT INFECTION, SITE NOT SPECIFIED: ICD-10-CM

## 2023-04-21 LAB
A VAGINAE DNA VAG QL NAA+PROBE: NORMAL
BACTERIA UR CULT: ABNORMAL
BVAB2 DNA VAG QL NAA+PROBE: NORMAL
C KRUSEI DNA VAG QL NAA+PROBE: NEGATIVE
C TRACH RRNA SPEC QL NAA+PROBE: NEGATIVE
ESTROGEN SERPL-MCNC: 64 PG/ML
MEGA1 DNA VAG QL NAA+PROBE: NORMAL
N GONORRHOEA RRNA SPEC QL NAA+PROBE: NEGATIVE
T VAGINALIS RRNA SPEC QL NAA+PROBE: NEGATIVE

## 2023-05-15 LAB — BACTERIA UR CULT: NORMAL

## 2023-06-07 ENCOUNTER — APPOINTMENT (OUTPATIENT)
Dept: UROGYNECOLOGY | Facility: CLINIC | Age: 68
End: 2023-06-07

## 2023-06-20 PROBLEM — N39.41 URGE INCONTINENCE: Status: ACTIVE | Noted: 2021-12-01

## 2023-06-20 PROBLEM — Z01.419 ENCOUNTER FOR ANNUAL ROUTINE GYNECOLOGICAL EXAMINATION: Status: ACTIVE | Noted: 2020-03-16

## 2023-06-20 PROBLEM — Z71.89 OTHER SPECIFIED COUNSELING: Status: ACTIVE | Noted: 2021-04-16

## 2023-06-20 PROBLEM — N64.4 BREAST PAIN: Status: ACTIVE | Noted: 2023-01-13

## 2023-06-20 NOTE — DISCUSSION/SUMMARY
[FreeTextEntry1] : A: 67-year-old for annual GYN exam, urinary frequency/incontinence, microscopic hematuria/leukocytes in urine, vulvovaginal atrophy, vulvar and vaginal itching with discharge, intermittent bilateral breast pain, cervical lesion/fibroid?,  Osteoporosis/osteopenia\par \par P: GYN exam done\par Pap smear and genital culture done\par Urine culture sent\par Diflucan and clobetasol Rx sent\par Safe sex practices if active\par Pain and bleeding precautions\par Patient encouraged to do follow-up pelvic ultrasound as recommended\par Referral for breast surgery consultation\par Hormone panel done\par Encourage healthy diet and exercise\par Follow-up after test results and imaging done or as needed\par

## 2023-06-20 NOTE — PHYSICAL EXAM
[Chaperone Declined] : Patient declined chaperone [Appropriately responsive] : appropriately responsive [Alert] : alert [No Acute Distress] : no acute distress [No Lymphadenopathy] : no lymphadenopathy [Regular Rate Rhythm] : regular rate rhythm [No Murmurs] : no murmurs [Soft] : soft [Non-tender] : non-tender [Non-distended] : non-distended [No Mass] : no mass [Oriented x3] : oriented x3 [Examination Of The Breasts] : a normal appearance [No Discharge] : no discharge [No Masses] : no breast masses were palpable [Vulvar Atrophy] : vulvar atrophy [No Lesions] : no lesions  [Labia Majora] : normal [Labia Minora] : normal [Normal] : normal [Atrophy] : atrophy [No Bleeding] : There was no active vaginal bleeding [Uterine Adnexae] : normal [FreeTextEntry6] : No reproducible tenderness on today's exam [Tenderness] : nontender [Enlarged ___ wks] : not enlarged [Mass ___ cm] : no uterine mass was palpated [FreeTextEntry4] : Minimal yellow discharge-no odor, genital culture done [FreeTextEntry5] : Pap smear done

## 2023-06-20 NOTE — HISTORY OF PRESENT ILLNESS
[Patient reported mammogram was normal] : Patient reported mammogram was normal [Patient reported PAP Smear was normal] : Patient reported PAP Smear was normal [Patient reported bone density results were abnormal] : Patient reported bone density results were abnormal [Patient reported colonoscopy was normal] : Patient reported colonoscopy was normal [LMP unknown] : LMP unknown [postmenopausal] : postmenopausal [N] : Patient is not sexually active [Y] : Positive pregnancy history [unknown] : Patient is unsure of the date of her LMP [Menarche Age: ____] : age at menarche was [unfilled] [Currently In Menopause] : currently in menopause [Menopause Age: ____] : age at menopause was [unfilled] [Previously active] : previously active [Gonorrhea test offered] : Gonorrhea test offered [Chlamydia test offered] : Chlamydia test offered [Trichomonas test offered] : Trichomonas test offered [HPV test offered] : HPV test offered [No] : No [TextBox_4] : 67-year-old para 1-0-0-1 in menopause came for annual GYN exam and Pap smear.  Patient denies postmenopausal bleeding, pelvic pain, or dysuria.  She does complain of vulvar and vaginal itching with some discharge recently.  She also complains of urinary frequency and incontinence.  She has been following with urogynecology as well.  She also complains of  bilateral breast tenderness intermittent for several weeks but denies palpable masses, skin changes, nipple discharge or trauma.  She had diagnostic imaging done that was negative.  Counseled patient on checking hormones and potentially evaluation with breast surgeon.  Patient has history of abnormal Pap with HPV and cone biopsy and normal after.  She denies other STDs or ovarian cysts.  She does a remote history of fibroids but is asymptomatic.  She had lymph ultrasound done November 2022 which showed a focus of the cervix that most likely is a calcified cervical fibroid but patient was counseled on follow-up sonogram versus MRI for evaluation and stability.  She was given a referral but has not gone back for repeat pelvic ultrasound but states she will make an appointment.  She is not sexually active.\par \par U dip: Microscopic hematuria, trace leukocytes [Mammogramdate] : 1/2023 [PapSmeardate] : 4/2021 [BoneDensityDate] : 1/2023 [TextBox_37] : osteoporosis/osteopenia  [ColonoscopyDate] : 10/2020 [TextBox_43] : benign polyps, f/u 3 yrs  [PGxTotal] : 1 [[facility]] : [facility] [PGHxPremature] : 0 [PGHxAbortions] : 0 [[facility] Centeriving] : 1 [FreeTextEntry1] : c/s

## 2024-01-31 ENCOUNTER — OUTPATIENT (OUTPATIENT)
Dept: OUTPATIENT SERVICES | Facility: HOSPITAL | Age: 69
LOS: 1 days | End: 2024-01-31
Payer: MEDICARE

## 2024-01-31 ENCOUNTER — RESULT REVIEW (OUTPATIENT)
Age: 69
End: 2024-01-31

## 2024-01-31 DIAGNOSIS — Z98.890 OTHER SPECIFIED POSTPROCEDURAL STATES: Chronic | ICD-10-CM

## 2024-01-31 DIAGNOSIS — Z12.31 ENCOUNTER FOR SCREENING MAMMOGRAM FOR MALIGNANT NEOPLASM OF BREAST: ICD-10-CM

## 2024-01-31 PROCEDURE — 77067 SCR MAMMO BI INCL CAD: CPT

## 2024-01-31 PROCEDURE — 77063 BREAST TOMOSYNTHESIS BI: CPT | Mod: 26

## 2024-01-31 PROCEDURE — 76641 ULTRASOUND BREAST COMPLETE: CPT | Mod: 26,50

## 2024-01-31 PROCEDURE — 77067 SCR MAMMO BI INCL CAD: CPT | Mod: 26

## 2024-01-31 PROCEDURE — 77063 BREAST TOMOSYNTHESIS BI: CPT

## 2024-01-31 PROCEDURE — 76641 ULTRASOUND BREAST COMPLETE: CPT | Mod: 50

## 2024-02-01 DIAGNOSIS — Z12.31 ENCOUNTER FOR SCREENING MAMMOGRAM FOR MALIGNANT NEOPLASM OF BREAST: ICD-10-CM

## 2024-05-07 ENCOUNTER — NON-APPOINTMENT (OUTPATIENT)
Age: 69
End: 2024-05-07

## 2024-05-16 ENCOUNTER — APPOINTMENT (OUTPATIENT)
Dept: UROGYNECOLOGY | Facility: CLINIC | Age: 69
End: 2024-05-16
Payer: MEDICARE

## 2024-05-16 VITALS
WEIGHT: 135 LBS | SYSTOLIC BLOOD PRESSURE: 133 MMHG | HEIGHT: 62 IN | BODY MASS INDEX: 24.84 KG/M2 | DIASTOLIC BLOOD PRESSURE: 83 MMHG | HEART RATE: 76 BPM

## 2024-05-16 DIAGNOSIS — R35.0 FREQUENCY OF MICTURITION: ICD-10-CM

## 2024-05-16 PROCEDURE — 51701 INSERT BLADDER CATHETER: CPT

## 2024-05-16 PROCEDURE — 99459 PELVIC EXAMINATION: CPT

## 2024-05-16 PROCEDURE — 99214 OFFICE O/P EST MOD 30 MIN: CPT | Mod: 25

## 2024-05-16 RX ORDER — AMOXICILLIN AND CLAVULANATE POTASSIUM 875; 125 MG/1; MG/1
875-125 TABLET, COATED ORAL
Qty: 14 | Refills: 0 | Status: ACTIVE | COMMUNITY
Start: 2024-05-16 | End: 1900-01-01

## 2024-05-16 RX ORDER — CLOBETASOL PROPIONATE 0.5 MG/G
0.05 OINTMENT TOPICAL TWICE DAILY
Qty: 1 | Refills: 0 | Status: COMPLETED | COMMUNITY
Start: 2023-04-17 | End: 2024-05-16

## 2024-05-16 RX ORDER — FLUCONAZOLE 150 MG/1
150 TABLET ORAL
Qty: 1 | Refills: 1 | Status: COMPLETED | COMMUNITY
Start: 2023-04-17 | End: 2024-05-16

## 2024-05-16 RX ORDER — EZETIMIBE 10 MG/1
10 TABLET ORAL
Refills: 0 | Status: ACTIVE | COMMUNITY

## 2024-05-16 RX ORDER — AMPICILLIN 500 MG/1
500 CAPSULE ORAL 4 TIMES DAILY
Qty: 28 | Refills: 0 | Status: COMPLETED | COMMUNITY
Start: 2023-04-21 | End: 2024-05-16

## 2024-05-17 NOTE — PHYSICAL EXAM
[Chaperone Present] : A chaperone was present in the examining room during all aspects of the physical examination [14436] : A chaperone was present during the pelvic exam. [FreeTextEntry2] : Sruthi MILLAN  [FreeTextEntry1] : Indication: Urinary Frequency Urethra was prepped in sterile fashion and then a sterile non- indwelling catheter (14F) was used by me to drain the bladder. The patient tolerated the procedure well. cath: 40 cc  Abdomen: soft, nontender, nondistended pelvic exam: no vaginal pain or tenderness, + minimal bladder tenderness   [No Acute Distress] : in no acute distress [Well developed] : well developed [Well Nourished] : ~L well nourished

## 2024-05-17 NOTE — REASON FOR VISIT
[TextEntry] : Reason for visit: Follow Up - Cath Specimen Voids per day: 10-12 Voids per night:  3  Urge incontinence: Yes (+) frequency Stress incontinence: No Constipation: No    Fecal incontinence: No    Vaginal bulge: No

## 2024-05-17 NOTE — HISTORY OF PRESENT ILLNESS
[FreeTextEntry1] : Patient is here for cath specimen for urinary frequency and pressure Last seen 12/07/22022 for med check  s/p cone biopsy, s/p c/s, s/p phil no glaucoma No prolapse  Pelvic floor PT Diet changes  4/17/23 + ucx > 100 K E. Coli and >100 K Enterococcus Faecalis, treated with ampicillin (patient asymptomatic)  Patient started to experience UTI symptoms 5/11 and went to urgent care for testing. She was given Keflex TID for 7 days. After 5 days, she was called and advised to stop the Keflex as the culture was negative.  Here for catheterized specimen today for continued UTI symptoms. Notes bothersome urinary frequency and pelvic pressure. Denies fever/chills or hematuria. Will be going on vacation to [place] and would like antibiotics in case the culture is positive.

## 2024-05-17 NOTE — DISCUSSION/SUMMARY
[FreeTextEntry1] :  Urinary Frequency Urine culture obtained. Will follow up. Will treat accordingly if necessary Will send empiric treatment as patient will be flying to [place] over the weekend, rx Augmentin If culture is negative or symptoms do not improve after treatment, consider treatment for OAB for urinary frequency and pressure

## 2024-05-17 NOTE — END OF VISIT
[TextEntry] :  I, Denae Caban PA-C, spent 30 minutes face to face with the patient. This excludes cath

## 2024-05-17 NOTE — COUNSELING
[FreeTextEntry1] :  If you feel like you have an infection it is important for you to call our office and we will arrange testing of your urine.  I sent an antibiotic-Augmentin twice a day for 7 days  We will contact you if the urine results are abnormal.  If the urine culture is negative and you continue to experience pressure and frequency, we will discuss next steps in management.

## 2024-05-20 LAB — URINE CULTURE <10: NORMAL

## 2024-07-31 ENCOUNTER — APPOINTMENT (OUTPATIENT)
Dept: UROGYNECOLOGY | Facility: CLINIC | Age: 69
End: 2024-07-31
Payer: MEDICARE

## 2024-07-31 VITALS
HEART RATE: 80 BPM | DIASTOLIC BLOOD PRESSURE: 84 MMHG | SYSTOLIC BLOOD PRESSURE: 118 MMHG | HEIGHT: 62 IN | WEIGHT: 135 LBS | BODY MASS INDEX: 24.84 KG/M2

## 2024-07-31 DIAGNOSIS — R35.1 NOCTURIA: ICD-10-CM

## 2024-07-31 DIAGNOSIS — R35.0 FREQUENCY OF MICTURITION: ICD-10-CM

## 2024-07-31 DIAGNOSIS — N39.41 URGE INCONTINENCE: ICD-10-CM

## 2024-07-31 DIAGNOSIS — R39.15 URGENCY OF URINATION: ICD-10-CM

## 2024-07-31 PROCEDURE — 99214 OFFICE O/P EST MOD 30 MIN: CPT

## 2024-07-31 NOTE — HISTORY OF PRESENT ILLNESS
[FreeTextEntry1] : Patient is here for 6 weeks med check for urge incontinence. Last seen on 5/16/24 for med check, cath specimen.    s/p cone biopsy, s/p c/s, s/p phil no glaucoma No prolapse  5/11/24, seen at urgent care for UTI symptoms and treated with Keflex x 7days 5/16/24, ucx: negative, pt treated empirically with Augmentin  Gemtesa 75mg: did not start   Today, patient states that after her trip to [place], she forgot to start Gemtesa and her pharmacy did not inform her of medication. She is complaining of urinary frequency and urgency at night mostly, wakes up 2-3 times at night. During the day she is not bothered although urinates every 2 hours. Patient does not feel she has an infection. Would like to try Gemtesa.

## 2024-07-31 NOTE — REASON FOR VISIT
[TextEntry] : Reason for visit: Follow Up Voids per day:   10-12 Voids per night:   3 Urge incontinence: Yes (+) frequency   Occasional (+) urgency Stress incontinence: No Constipation: No Fecal incontinence: No    Vaginal bulge: No

## 2024-07-31 NOTE — DISCUSSION/SUMMARY
[FreeTextEntry1] : Nocturia/Urinary frequency, urgency Discussed options for urge incontinence including dietary changes, pelvic floor physical therapy, and medication management. Discussed risk/benefit of anticholinergics versus beta 3 agonists. Patient does not want to try anti cholinergic medications due to risk of side effects. Rx: Gemtesa 75mg QD, 30 days, 2 refills  Precautions reviewed. Will return in 2 months for follow up or earlier if she has any issues.

## 2024-07-31 NOTE — COUNSELING
[FreeTextEntry1] : If you feel like you have an infection it is important for you to call our office and we will arrange testing of your urine.  Please begin taking Gemtesa 75mg once a day. It takes up to 6 weeks to go into full effect. Please  your refill when you complete the 1st bottle.  Please call my office if you have any issues with the cost or side effects of the medication.   Schedule a 2 months follow up med check appointment with CHANTEL Miles.

## 2024-08-02 RX ORDER — VIBEGRON 75 MG/1
75 TABLET, FILM COATED ORAL
Qty: 30 | Refills: 2 | Status: ACTIVE | COMMUNITY
Start: 2024-07-31 | End: 1900-01-01

## 2024-09-30 ENCOUNTER — APPOINTMENT (OUTPATIENT)
Dept: UROGYNECOLOGY | Facility: CLINIC | Age: 69
End: 2024-09-30

## 2025-01-08 RX ORDER — CLOTRIMAZOLE AND BETAMETHASONE DIPROPIONATE 10; .5 MG/G; MG/G
1-0.05 CREAM TOPICAL TWICE DAILY
Qty: 1 | Refills: 1 | Status: ACTIVE | COMMUNITY
Start: 2025-01-07

## 2025-01-24 ENCOUNTER — RESULT REVIEW (OUTPATIENT)
Age: 70
End: 2025-01-24

## 2025-01-24 ENCOUNTER — OUTPATIENT (OUTPATIENT)
Dept: OUTPATIENT SERVICES | Facility: HOSPITAL | Age: 70
LOS: 1 days | End: 2025-01-24
Payer: MEDICARE

## 2025-01-24 DIAGNOSIS — Z13.820 ENCOUNTER FOR SCREENING FOR OSTEOPOROSIS: ICD-10-CM

## 2025-01-24 DIAGNOSIS — Z98.890 OTHER SPECIFIED POSTPROCEDURAL STATES: Chronic | ICD-10-CM

## 2025-01-24 DIAGNOSIS — Z00.8 ENCOUNTER FOR OTHER GENERAL EXAMINATION: ICD-10-CM

## 2025-01-24 PROCEDURE — 77080 DXA BONE DENSITY AXIAL: CPT

## 2025-01-24 PROCEDURE — 77080 DXA BONE DENSITY AXIAL: CPT | Mod: 26

## 2025-01-25 DIAGNOSIS — Z13.820 ENCOUNTER FOR SCREENING FOR OSTEOPOROSIS: ICD-10-CM

## 2025-01-28 DIAGNOSIS — M81.0 AGE-RELATED OSTEOPOROSIS WITHOUT CURRENT PATHOLOGICAL FRACTURE: ICD-10-CM

## 2025-03-20 RX ORDER — MIRABEGRON 25 MG/1
25 TABLET, EXTENDED RELEASE ORAL
Qty: 30 | Refills: 1 | Status: ACTIVE | COMMUNITY
Start: 2025-03-14 | End: 1900-01-01

## 2025-03-28 ENCOUNTER — LABORATORY RESULT (OUTPATIENT)
Age: 70
End: 2025-03-28

## 2025-03-28 RX ORDER — CEPHALEXIN 500 MG/1
500 TABLET ORAL
Qty: 14 | Refills: 0 | Status: COMPLETED | COMMUNITY
Start: 2025-03-28 | End: 2025-04-04

## 2025-03-28 RX ORDER — FLUCONAZOLE 150 MG/1
150 TABLET ORAL
Qty: 2 | Refills: 0 | Status: ACTIVE | COMMUNITY
Start: 2025-03-28 | End: 1900-01-01

## 2025-03-31 LAB — BACTERIA UR CULT: NORMAL

## 2025-04-10 ENCOUNTER — APPOINTMENT (OUTPATIENT)
Dept: UROGYNECOLOGY | Facility: CLINIC | Age: 70
End: 2025-04-10
Payer: MEDICARE

## 2025-04-10 VITALS
BODY MASS INDEX: 24.84 KG/M2 | WEIGHT: 135 LBS | DIASTOLIC BLOOD PRESSURE: 76 MMHG | HEART RATE: 78 BPM | HEIGHT: 62 IN | SYSTOLIC BLOOD PRESSURE: 119 MMHG

## 2025-04-10 DIAGNOSIS — L29.2 PRURITUS VULVAE: ICD-10-CM

## 2025-04-10 DIAGNOSIS — R35.0 FREQUENCY OF MICTURITION: ICD-10-CM

## 2025-04-10 PROCEDURE — 99459 PELVIC EXAMINATION: CPT

## 2025-04-10 PROCEDURE — 51701 INSERT BLADDER CATHETER: CPT

## 2025-04-10 PROCEDURE — 99214 OFFICE O/P EST MOD 30 MIN: CPT | Mod: 25

## 2025-04-10 RX ORDER — CLOTRIMAZOLE AND BETAMETHASONE DIPROPIONATE 10; .5 MG/G; MG/G
1-0.05 CREAM TOPICAL TWICE DAILY
Qty: 1 | Refills: 0 | Status: ACTIVE | COMMUNITY
Start: 2025-04-10 | End: 1900-01-01

## 2025-04-14 LAB
A VAGINAE DNA VAG QL NAA+PROBE: NORMAL
BVAB2 DNA VAG QL NAA+PROBE: NORMAL
C KRUSEI DNA VAG QL NAA+PROBE: NEGATIVE
C TRACH RRNA SPEC QL NAA+PROBE: NEGATIVE
CANDIDA DNA VAG QL NAA+PROBE: NEGATIVE
MEGA1 DNA VAG QL NAA+PROBE: NORMAL
N GONORRHOEA RRNA SPEC QL NAA+PROBE: NEGATIVE
T VAGINALIS RRNA SPEC QL NAA+PROBE: NEGATIVE
URINE CULTURE <10: NORMAL

## 2025-04-18 ENCOUNTER — APPOINTMENT (OUTPATIENT)
Dept: OBGYN | Facility: CLINIC | Age: 70
End: 2025-04-18

## 2025-04-21 ENCOUNTER — RESULT REVIEW (OUTPATIENT)
Age: 70
End: 2025-04-21

## 2025-04-21 ENCOUNTER — OUTPATIENT (OUTPATIENT)
Dept: OUTPATIENT SERVICES | Facility: HOSPITAL | Age: 70
LOS: 1 days | End: 2025-04-21
Payer: MEDICARE

## 2025-04-21 DIAGNOSIS — R92.2 INCONCLUSIVE MAMMOGRAM: ICD-10-CM

## 2025-04-21 DIAGNOSIS — Z98.890 OTHER SPECIFIED POSTPROCEDURAL STATES: Chronic | ICD-10-CM

## 2025-04-21 DIAGNOSIS — Z12.31 ENCOUNTER FOR SCREENING MAMMOGRAM FOR MALIGNANT NEOPLASM OF BREAST: ICD-10-CM

## 2025-04-21 PROCEDURE — 77063 BREAST TOMOSYNTHESIS BI: CPT

## 2025-04-21 PROCEDURE — 76641 ULTRASOUND BREAST COMPLETE: CPT | Mod: 50

## 2025-04-21 PROCEDURE — 77067 SCR MAMMO BI INCL CAD: CPT

## 2025-04-21 PROCEDURE — 76641 ULTRASOUND BREAST COMPLETE: CPT | Mod: 26,50,GA

## 2025-04-21 PROCEDURE — 77063 BREAST TOMOSYNTHESIS BI: CPT | Mod: 26

## 2025-04-21 PROCEDURE — 77067 SCR MAMMO BI INCL CAD: CPT | Mod: 26

## 2025-04-22 DIAGNOSIS — Z12.31 ENCOUNTER FOR SCREENING MAMMOGRAM FOR MALIGNANT NEOPLASM OF BREAST: ICD-10-CM

## 2025-04-22 DIAGNOSIS — N64.89 OTHER SPECIFIED DISORDERS OF BREAST: ICD-10-CM

## 2025-04-22 DIAGNOSIS — R92.2 INCONCLUSIVE MAMMOGRAM: ICD-10-CM

## 2025-04-23 ENCOUNTER — RESULT REVIEW (OUTPATIENT)
Age: 70
End: 2025-04-23

## 2025-04-23 ENCOUNTER — OUTPATIENT (OUTPATIENT)
Dept: OUTPATIENT SERVICES | Facility: HOSPITAL | Age: 70
LOS: 1 days | End: 2025-04-23
Payer: MEDICARE

## 2025-04-23 DIAGNOSIS — Z98.890 OTHER SPECIFIED POSTPROCEDURAL STATES: Chronic | ICD-10-CM

## 2025-04-23 DIAGNOSIS — R92.8 OTHER ABNORMAL AND INCONCLUSIVE FINDINGS ON DIAGNOSTIC IMAGING OF BREAST: ICD-10-CM

## 2025-04-23 PROCEDURE — 76642 ULTRASOUND BREAST LIMITED: CPT | Mod: RT

## 2025-04-23 PROCEDURE — 77065 DX MAMMO INCL CAD UNI: CPT | Mod: 26,RT

## 2025-04-23 PROCEDURE — G0279: CPT

## 2025-04-23 PROCEDURE — 76642 ULTRASOUND BREAST LIMITED: CPT | Mod: 26,RT

## 2025-04-23 PROCEDURE — 77065 DX MAMMO INCL CAD UNI: CPT | Mod: RT

## 2025-04-23 PROCEDURE — G0279: CPT | Mod: 26

## 2025-04-24 DIAGNOSIS — R92.8 OTHER ABNORMAL AND INCONCLUSIVE FINDINGS ON DIAGNOSTIC IMAGING OF BREAST: ICD-10-CM

## 2025-05-19 ENCOUNTER — APPOINTMENT (OUTPATIENT)
Dept: UROGYNECOLOGY | Facility: CLINIC | Age: 70
End: 2025-05-19

## 2025-05-22 ENCOUNTER — APPOINTMENT (OUTPATIENT)
Dept: UROGYNECOLOGY | Facility: CLINIC | Age: 70
End: 2025-05-22
Payer: MEDICARE

## 2025-05-22 VITALS
DIASTOLIC BLOOD PRESSURE: 70 MMHG | HEART RATE: 70 BPM | BODY MASS INDEX: 25.03 KG/M2 | HEIGHT: 62 IN | WEIGHT: 136 LBS | SYSTOLIC BLOOD PRESSURE: 104 MMHG

## 2025-05-22 DIAGNOSIS — R35.0 FREQUENCY OF MICTURITION: ICD-10-CM

## 2025-05-22 DIAGNOSIS — N39.41 URGE INCONTINENCE: ICD-10-CM

## 2025-05-22 DIAGNOSIS — R35.1 NOCTURIA: ICD-10-CM

## 2025-05-22 PROCEDURE — 99213 OFFICE O/P EST LOW 20 MIN: CPT

## 2025-05-22 PROCEDURE — G2211 COMPLEX E/M VISIT ADD ON: CPT

## 2025-06-12 ENCOUNTER — APPOINTMENT (OUTPATIENT)
Dept: OBGYN | Facility: CLINIC | Age: 70
End: 2025-06-12

## 2025-06-24 ENCOUNTER — NON-APPOINTMENT (OUTPATIENT)
Age: 70
End: 2025-06-24

## 2025-06-24 ENCOUNTER — APPOINTMENT (OUTPATIENT)
Dept: OBGYN | Facility: CLINIC | Age: 70
End: 2025-06-24

## 2025-06-24 VITALS
HEART RATE: 64 BPM | DIASTOLIC BLOOD PRESSURE: 83 MMHG | WEIGHT: 135 LBS | TEMPERATURE: 98.6 F | SYSTOLIC BLOOD PRESSURE: 143 MMHG | BODY MASS INDEX: 24.84 KG/M2 | HEIGHT: 62 IN

## 2025-06-24 PROCEDURE — G0101: CPT | Mod: GA

## 2025-06-25 PROBLEM — R92.30 DENSE BREAST TISSUE: Status: ACTIVE | Noted: 2023-01-04

## 2025-06-27 LAB — HPV HIGH+LOW RISK DNA PNL CVX: NOT DETECTED

## 2025-06-27 RX ORDER — MIRABEGRON 50 MG/1
50 TABLET, EXTENDED RELEASE ORAL
Qty: 90 | Refills: 0 | Status: ACTIVE | COMMUNITY
Start: 2025-06-27 | End: 1900-01-01

## 2025-06-30 RX ORDER — MIRABEGRON 50 MG/1
50 TABLET, EXTENDED RELEASE ORAL
Qty: 30 | Refills: 1 | Status: ACTIVE | COMMUNITY
Start: 2025-06-30 | End: 1900-01-01

## 2025-08-19 ENCOUNTER — NON-APPOINTMENT (OUTPATIENT)
Age: 70
End: 2025-08-19

## 2025-08-21 ENCOUNTER — APPOINTMENT (OUTPATIENT)
Dept: UROGYNECOLOGY | Facility: CLINIC | Age: 70
End: 2025-08-21

## 2025-08-21 VITALS
SYSTOLIC BLOOD PRESSURE: 100 MMHG | WEIGHT: 135 LBS | BODY MASS INDEX: 24.84 KG/M2 | HEART RATE: 72 BPM | DIASTOLIC BLOOD PRESSURE: 66 MMHG | HEIGHT: 62 IN

## 2025-08-21 DIAGNOSIS — R39.15 URGENCY OF URINATION: ICD-10-CM

## 2025-08-21 DIAGNOSIS — N39.41 URGE INCONTINENCE: ICD-10-CM

## 2025-08-21 DIAGNOSIS — R35.1 NOCTURIA: ICD-10-CM

## 2025-08-21 PROCEDURE — 99214 OFFICE O/P EST MOD 30 MIN: CPT

## 2025-08-21 PROCEDURE — G2211 COMPLEX E/M VISIT ADD ON: CPT

## 2025-09-11 ENCOUNTER — APPOINTMENT (OUTPATIENT)
Dept: GASTROENTEROLOGY | Facility: CLINIC | Age: 70
End: 2025-09-11
Payer: MEDICARE

## 2025-09-11 VITALS — WEIGHT: 138 LBS | BODY MASS INDEX: 25.4 KG/M2 | HEIGHT: 62 IN

## 2025-09-11 DIAGNOSIS — Z12.11 ENCOUNTER FOR SCREENING FOR MALIGNANT NEOPLASM OF COLON: ICD-10-CM

## 2025-09-11 DIAGNOSIS — K59.00 CONSTIPATION, UNSPECIFIED: ICD-10-CM

## 2025-09-11 PROCEDURE — 99204 OFFICE O/P NEW MOD 45 MIN: CPT

## 2025-09-11 RX ORDER — SODIUM SULFATE, MAGNESIUM SULFATE, AND POTASSIUM CHLORIDE 17.75; 2.7; 2.25 G/1; G/1; G/1
1479-225-188 TABLET ORAL
Qty: 24 | Refills: 0 | Status: ACTIVE | COMMUNITY
Start: 2025-09-11 | End: 1900-01-01

## 2025-09-11 RX ORDER — SODIUM SULFATE, POTASSIUM SULFATE AND MAGNESIUM SULFATE 1.6; 3.13; 17.5 G/177ML; G/177ML; G/177ML
17.5-3.13-1.6 SOLUTION ORAL
Qty: 1 | Refills: 0 | Status: ACTIVE | COMMUNITY
Start: 2025-09-11 | End: 1900-01-01

## 2025-09-19 ENCOUNTER — TRANSCRIPTION ENCOUNTER (OUTPATIENT)
Age: 70
End: 2025-09-19

## 2025-09-19 ENCOUNTER — RESULT REVIEW (OUTPATIENT)
Age: 70
End: 2025-09-19

## 2025-09-19 DIAGNOSIS — K21.00 GASTRO-ESOPHAGEAL REFLUX DISEASE WITH ESOPHAGITIS, WITHOUT BLEEDING: ICD-10-CM

## 2025-09-19 DIAGNOSIS — R10.13 EPIGASTRIC PAIN: ICD-10-CM

## 2025-09-19 RX ORDER — PANTOPRAZOLE 40 MG/1
40 TABLET, DELAYED RELEASE ORAL DAILY
Qty: 30 | Refills: 6 | Status: ACTIVE | COMMUNITY
Start: 2025-09-19 | End: 1900-01-01